# Patient Record
Sex: FEMALE | URBAN - METROPOLITAN AREA
[De-identification: names, ages, dates, MRNs, and addresses within clinical notes are randomized per-mention and may not be internally consistent; named-entity substitution may affect disease eponyms.]

---

## 2021-05-17 ENCOUNTER — OFFICE VISIT (OUTPATIENT)
Dept: URBAN - METROPOLITAN AREA CLINIC 13 | Facility: CLINIC | Age: 86
End: 2021-05-17
Payer: MEDICARE

## 2021-05-17 DIAGNOSIS — Z96.1 PRESENCE OF INTRAOCULAR LENS: ICD-10-CM

## 2021-05-17 DIAGNOSIS — H35.363 DRUSEN (DEGENERATIVE) OF MACULA, BILATERAL: ICD-10-CM

## 2021-05-17 PROCEDURE — 99204 OFFICE O/P NEW MOD 45 MIN: CPT | Performed by: OPHTHALMOLOGY

## 2021-05-17 PROCEDURE — 92134 CPTRZ OPH DX IMG PST SGM RTA: CPT | Performed by: OPHTHALMOLOGY

## 2021-05-17 ASSESSMENT — INTRAOCULAR PRESSURE
OS: 8
OD: 13

## 2021-05-17 NOTE — IMPRESSION/PLAN
Impression: Vitreous degeneration, bilateral: H43.813. New onset OS Plan: The patient has a posterior vitreous detachment. No retinal breaks were identified on exam.  The findings were discussed with the patient. The signs and symptoms of a retinal tear and detachment were reviewed. The patient was advised to return ASAP if they noted any new floaters, flashing lights or a shadow in their vision. 

6 weeks DE OS

## 2021-05-17 NOTE — IMPRESSION/PLAN
Impression: Drusen (degenerative) of macula, bilateral: H35.363. OCT OU - no IRF/SRF  / 274 Plan: Mild drusen only, possible precursor to AMD. No clear benefit to AREDS 2 supplementation without moderate AMD, however may consider. Refrain from tobacco use. Monitor [place] grid for changes.

## 2021-05-18 ENCOUNTER — OFFICE VISIT (OUTPATIENT)
Dept: URBAN - METROPOLITAN AREA CLINIC 13 | Facility: CLINIC | Age: 86
End: 2021-05-18
Payer: MEDICARE

## 2021-05-18 DIAGNOSIS — H43.813 VITREOUS DEGENERATION, BILATERAL: Primary | ICD-10-CM

## 2021-05-18 PROCEDURE — 99213 OFFICE O/P EST LOW 20 MIN: CPT | Performed by: OPHTHALMOLOGY

## 2021-05-18 ASSESSMENT — INTRAOCULAR PRESSURE
OD: 10
OS: 9

## 2021-05-18 NOTE — IMPRESSION/PLAN
Impression: Subjective visual disturbance: H53.10. Plan: Had light sensitivity when waking up this morning. Resolved by the time she came in to clinic. No inflammation seen. No surface changes seen. Retina appears stable. D/w patient. Reassured. f/u as scheduled.

## 2021-05-18 NOTE — IMPRESSION/PLAN
Impression: Vitreous degeneration, bilateral: H43.813. New onset OS Plan: The patient has a posterior vitreous detachment. No retinal breaks were identified on exam.  The findings were discussed with the patient. The signs and symptoms of a retinal tear and detachment were reviewed. The patient was advised to return ASAP if they noted any new floaters, flashing lights or a shadow in their vision. 

f/u as scheduled Name band;

## 2021-07-12 ENCOUNTER — OFFICE VISIT (OUTPATIENT)
Dept: URBAN - METROPOLITAN AREA CLINIC 13 | Facility: CLINIC | Age: 86
End: 2021-07-12
Payer: MEDICARE

## 2021-07-12 DIAGNOSIS — H53.10 SUBJECTIVE VISUAL DISTURBANCE: ICD-10-CM

## 2021-07-12 PROCEDURE — 99214 OFFICE O/P EST MOD 30 MIN: CPT | Performed by: OPHTHALMOLOGY

## 2021-07-12 ASSESSMENT — INTRAOCULAR PRESSURE
OD: 10
OS: 12

## 2021-07-12 NOTE — IMPRESSION/PLAN
Impression: Subjective visual disturbance: H53.10. Plan: Had light sensitivity last visit. Resolved by the time she came in to clinic. No return of symptoms.

## 2021-07-12 NOTE — IMPRESSION/PLAN
Impression: Vitreous degeneration, bilateral: H43.813. New onset OS Plan: No retinal breaks were identified on exam.  The findings were discussed with the patient. The signs and symptoms of a retinal tear and detachment were again reviewed with the patient. The patient was advised to return if they noted any new floaters, flashing lights or a shadow in their vision.
